# Patient Record
Sex: FEMALE
[De-identification: names, ages, dates, MRNs, and addresses within clinical notes are randomized per-mention and may not be internally consistent; named-entity substitution may affect disease eponyms.]

---

## 2019-11-02 ENCOUNTER — HOSPITAL ENCOUNTER (EMERGENCY)
Dept: HOSPITAL 25 - UCEAST | Age: 19
Discharge: HOME | End: 2019-11-02
Payer: MEDICAID

## 2019-11-02 DIAGNOSIS — G43.909: ICD-10-CM

## 2019-11-02 DIAGNOSIS — N39.0: Primary | ICD-10-CM

## 2019-11-02 DIAGNOSIS — Z79.899: ICD-10-CM

## 2019-11-02 PROCEDURE — 84702 CHORIONIC GONADOTROPIN TEST: CPT

## 2019-11-02 PROCEDURE — 81003 URINALYSIS AUTO W/O SCOPE: CPT

## 2019-11-02 PROCEDURE — 99202 OFFICE O/P NEW SF 15 MIN: CPT

## 2019-11-02 PROCEDURE — G0463 HOSPITAL OUTPT CLINIC VISIT: HCPCS

## 2019-11-02 NOTE — UC
Complaint Female HPI





- HPI Summary


HPI Summary: 





patient has had dysuria for 2 days, has had 5 UTIs this year and resolve after 

treatment. states that at least twice the urine dip was negative and then she 

would get a call after culture that there was a UTI





- History Of Current Complaint


Chief Complaint: UCGU


Stated Complaint: UTI


Time Seen by Provider: 11/02/19 12:52


Hx Obtained From: Patient


Hx Last Menstrual Period: 10/7/19


Pregnant?: No


Onset/Duration: Gradual Onset


Timing: Constant


Severity Initially: Mild


Severity Currently: Moderate


Pain Intensity: 7


Character: Burning


Aggravating Factor(s): Urination


Alleviating Factor(s): Nothing


Associated Signs And Symptoms: Positive: Negative.  Negative: Fever, Back Pain, 

Vaginal Bleeding/Discharge, Nausea


Related Hx: Similar Episode/Dx as: - UTI





- Allergies/Home Medications


Allergies/Adverse Reactions: 


 Allergies











Allergy/AdvReac Type Severity Reaction Status Date / Time


 


No Known Allergies Allergy   Verified 11/02/19 12:05











Home Medications: 


 Home Medications





Dexamethasone [Decadron] 16 mg PO DAILY 11/02/19 [History Confirmed 11/02/19]


Norgestimate-Ethinyl Estradiol [Ortho Tri-Cyclen 28 Tablet] 1 each PO DAILY 11/ 02/19 [History Confirmed 11/02/19]


Propranolol TAB* [Inderal TAB*] 80 mg PO DAILY 11/02/19 [History Confirmed 11/02 /19]


Topiramate [Trokendi Xr] 25 mg PO DAILY 11/02/19 [History Confirmed 11/02/19]











PMH/Surg Hx/FS Hx/Imm Hx


Previously Healthy: Yes


GI/ History: Other - UTIs


Neurological History: Migraine





- Surgical History


Surgical History: None





- Family History


Known Family History: Positive: None





- Social History


Occupation: Student


Lives: With Family


Alcohol Use: None


Substance Use Type: None


Smoking Status (MU): Never Smoked Tobacco





Review of Systems


All Other Systems Reviewed And Are Negative: Yes


Constitutional: Positive: Negative


Respiratory: Positive: Negative


Cardiovascular: Positive: Negative


Genitourinary: Positive: Dysuria, Frequency.  Negative: Urgency, Vaginal/Penile 

Burning, Vaginal/Penile Discharge


Psychological: Positive: Negative


Is Patient Immunocompromised?: No





Physical Exam


Triage Information Reviewed: Yes


Appearance: Well-Appearing, No Pain Distress, Well-Nourished


Vital Signs: 


 Initial Vital Signs











Temp  98.7 F   11/02/19 12:09


 


Pulse  65   11/02/19 12:09


 


Resp  18   11/02/19 12:09


 


BP  110/70   11/02/19 12:09


 


Pulse Ox  99   11/02/19 12:09











Vital Signs Reviewed: Yes


Respiratory Exam: Normal


Respiratory: Positive: Lungs clear


Abdomen Description: Positive: Nontender, No Organomegaly, Soft.  Negative: CVA 

Tenderness (R), CVA Tenderness (L)


Pelvic Exam: Positive: Other - declines


Neurological Exam: Normal


Neurological: Positive: Alert


Psychological Exam: Normal


Skin Exam: Normal





 Complaint Female Dx





- Differential Dx/Diagnosis


Differential Diagnosis/HQI/PQRI: Pelvic Inflammatory Disease, Retained Foreign 

Body, Sexually Transmitted Disease, Urinary Tract Infection


Provider Diagnosis: 


 UTI (urinary tract infection)








Discharge ED





- Sign-Out/Discharge


Documenting (check all that apply): Patient Departure


All imaging exams completed and their final reports reviewed: No Studies





- Discharge Plan


Condition: Good


Disposition: HOME


Prescriptions: 


Sulfamethox/Trimethoprim DS* [Bactrim /160 TAB*] 1 tab PO BID #6 tab


Patient Education Materials:  Urinary Tract Infection in Women (ED)


Referrals: 


No Primary Care Phys,NOPCP [Primary Care Provider] - 


Additional Instructions: 


drink plenty of fluids





start bactrim and take as directed





follow-up with your primary care provider in 1 week for recheck





- Billing Disposition and Condition


Condition: GOOD


Disposition: Home